# Patient Record
Sex: FEMALE | URBAN - METROPOLITAN AREA
[De-identification: names, ages, dates, MRNs, and addresses within clinical notes are randomized per-mention and may not be internally consistent; named-entity substitution may affect disease eponyms.]

---

## 2017-05-06 ENCOUNTER — HOSPITAL ENCOUNTER (EMERGENCY)
Age: 82
Discharge: STILL ADMITTED OR ADMINISTRATIVE | End: 2017-05-06
Attending: EMERGENCY MEDICINE

## 2017-05-06 VITALS
TEMPERATURE: 98.1 F | HEART RATE: 55 BPM | WEIGHT: 120.37 LBS | RESPIRATION RATE: 18 BRPM | SYSTOLIC BLOOD PRESSURE: 157 MMHG | BODY MASS INDEX: 20.55 KG/M2 | OXYGEN SATURATION: 98 % | HEIGHT: 64 IN | DIASTOLIC BLOOD PRESSURE: 81 MMHG

## 2017-05-06 PROCEDURE — 75810000275 HC EMERGENCY DEPT VISIT NO LEVEL OF CARE

## 2017-05-06 NOTE — ED TRIAGE NOTES
Pt arrived via [address] from [facility] (formerly [facility]) where patient resides. Nurses called 911 this morning reporting patient having trouble swallowing and spitting up saliva. Patient has history of dementia and very poor historian. EMS contacted daughter Tiny Black and she reported that her mother has had previous episodes if trouble swallowing in past. VSS on RA. Unable to assess medical history, etc at this time because of patient confusion. She is alert and oriented person only. Sepsis Screening completed    (  )Patient meets SIRS criteria. (x )Patient does not meet SIRS criteria.       SIRS Criteria is achieved when two or more of the following are present   Temperature < 96.8°F (36°C) or > 100.9°F (38.3°C)   Heart Rate > 90 beats per minute   Respiratory Rate > 20 breaths per minute   WBC count > 12,000 or <4,000 or > 10% bands